# Patient Record
Sex: MALE | Race: BLACK OR AFRICAN AMERICAN | Employment: OTHER | URBAN - METROPOLITAN AREA
[De-identification: names, ages, dates, MRNs, and addresses within clinical notes are randomized per-mention and may not be internally consistent; named-entity substitution may affect disease eponyms.]

---

## 2020-11-29 ENCOUNTER — APPOINTMENT (OUTPATIENT)
Dept: GENERAL RADIOLOGY | Age: 50
End: 2020-11-29
Payer: MEDICAID

## 2020-11-29 ENCOUNTER — HOSPITAL ENCOUNTER (EMERGENCY)
Age: 50
Discharge: HOME | End: 2020-11-29
Attending: EMERGENCY MEDICINE
Payer: MEDICAID

## 2020-11-29 VITALS
OXYGEN SATURATION: 98 % | WEIGHT: 212.52 LBS | DIASTOLIC BLOOD PRESSURE: 97 MMHG | HEART RATE: 77 BPM | RESPIRATION RATE: 18 BRPM | SYSTOLIC BLOOD PRESSURE: 188 MMHG | TEMPERATURE: 99.2 F | BODY MASS INDEX: 32.21 KG/M2 | HEIGHT: 68 IN

## 2020-11-29 LAB
A/G RATIO: 1.3 (ref 1.1–2.2)
ALBUMIN SERPL-MCNC: 4 G/DL (ref 3.4–5)
ALP BLD-CCNC: 62 U/L (ref 40–129)
ALT SERPL-CCNC: 12 U/L (ref 10–40)
ANION GAP SERPL CALCULATED.3IONS-SCNC: 13 MMOL/L (ref 3–16)
AST SERPL-CCNC: 29 U/L (ref 15–37)
BASOPHILS ABSOLUTE: 0.1 K/UL (ref 0–0.2)
BASOPHILS RELATIVE PERCENT: 0.4 %
BILIRUB SERPL-MCNC: 0.4 MG/DL (ref 0–1)
BILIRUBIN URINE: NEGATIVE
BLOOD, URINE: NEGATIVE
BUN BLDV-MCNC: 8 MG/DL (ref 7–20)
CALCIUM SERPL-MCNC: 8.9 MG/DL (ref 8.3–10.6)
CHLORIDE BLD-SCNC: 102 MMOL/L (ref 99–110)
CLARITY: ABNORMAL
CO2: 23 MMOL/L (ref 21–32)
COLOR: YELLOW
CREAT SERPL-MCNC: 1 MG/DL (ref 0.9–1.3)
EOSINOPHILS ABSOLUTE: 0 K/UL (ref 0–0.6)
EOSINOPHILS RELATIVE PERCENT: 0 %
EPITHELIAL CELLS, UA: 3 /HPF (ref 0–5)
GFR AFRICAN AMERICAN: >60
GFR NON-AFRICAN AMERICAN: >60
GLOBULIN: 3 G/DL
GLUCOSE BLD-MCNC: 140 MG/DL (ref 70–99)
GLUCOSE URINE: NEGATIVE MG/DL
HCT VFR BLD CALC: 42.3 % (ref 40.5–52.5)
HEMOGLOBIN: 13.9 G/DL (ref 13.5–17.5)
HYALINE CASTS: 2 /LPF (ref 0–8)
KETONES, URINE: 40 MG/DL
LEUKOCYTE ESTERASE, URINE: NEGATIVE
LIPASE: 25 U/L (ref 13–60)
LYMPHOCYTES ABSOLUTE: 0.9 K/UL (ref 1–5.1)
LYMPHOCYTES RELATIVE PERCENT: 7.9 %
MCH RBC QN AUTO: 30.8 PG (ref 26–34)
MCHC RBC AUTO-ENTMCNC: 32.8 G/DL (ref 31–36)
MCV RBC AUTO: 93.9 FL (ref 80–100)
MICROSCOPIC EXAMINATION: YES
MONOCYTES ABSOLUTE: 0.5 K/UL (ref 0–1.3)
MONOCYTES RELATIVE PERCENT: 3.9 %
NEUTROPHILS ABSOLUTE: 10.4 K/UL (ref 1.7–7.7)
NEUTROPHILS RELATIVE PERCENT: 87.8 %
NITRITE, URINE: NEGATIVE
PDW BLD-RTO: 12.5 % (ref 12.4–15.4)
PH UA: 7 (ref 5–8)
PLATELET # BLD: 178 K/UL (ref 135–450)
PMV BLD AUTO: 8 FL (ref 5–10.5)
POTASSIUM REFLEX MAGNESIUM: 3.6 MMOL/L (ref 3.5–5.1)
PROTEIN UA: ABNORMAL MG/DL
RBC # BLD: 4.5 M/UL (ref 4.2–5.9)
RBC UA: 3 /HPF (ref 0–4)
SODIUM BLD-SCNC: 138 MMOL/L (ref 136–145)
SPECIFIC GRAVITY UA: 1.02 (ref 1–1.03)
TOTAL PROTEIN: 7 G/DL (ref 6.4–8.2)
TROPONIN: <0.01 NG/ML
URINE REFLEX TO CULTURE: ABNORMAL
URINE TYPE: ABNORMAL
UROBILINOGEN, URINE: 0.2 E.U./DL
WBC # BLD: 11.8 K/UL (ref 4–11)
WBC UA: 1 /HPF (ref 0–5)

## 2020-11-29 PROCEDURE — 36415 COLL VENOUS BLD VENIPUNCTURE: CPT

## 2020-11-29 PROCEDURE — 6370000000 HC RX 637 (ALT 250 FOR IP): Performed by: PHYSICIAN ASSISTANT

## 2020-11-29 PROCEDURE — 2500000003 HC RX 250 WO HCPCS: Performed by: PHYSICIAN ASSISTANT

## 2020-11-29 PROCEDURE — 6360000002 HC RX W HCPCS: Performed by: PHYSICIAN ASSISTANT

## 2020-11-29 PROCEDURE — 80053 COMPREHEN METABOLIC PANEL: CPT

## 2020-11-29 PROCEDURE — 99284 EMERGENCY DEPT VISIT MOD MDM: CPT

## 2020-11-29 PROCEDURE — 93005 ELECTROCARDIOGRAM TRACING: CPT | Performed by: PHYSICIAN ASSISTANT

## 2020-11-29 PROCEDURE — 71045 X-RAY EXAM CHEST 1 VIEW: CPT

## 2020-11-29 PROCEDURE — 96374 THER/PROPH/DIAG INJ IV PUSH: CPT

## 2020-11-29 PROCEDURE — 85025 COMPLETE CBC W/AUTO DIFF WBC: CPT

## 2020-11-29 PROCEDURE — 2580000003 HC RX 258: Performed by: PHYSICIAN ASSISTANT

## 2020-11-29 PROCEDURE — 83690 ASSAY OF LIPASE: CPT

## 2020-11-29 PROCEDURE — 81001 URINALYSIS AUTO W/SCOPE: CPT

## 2020-11-29 PROCEDURE — 96375 TX/PRO/DX INJ NEW DRUG ADDON: CPT

## 2020-11-29 PROCEDURE — 6370000000 HC RX 637 (ALT 250 FOR IP): Performed by: EMERGENCY MEDICINE

## 2020-11-29 PROCEDURE — 6360000002 HC RX W HCPCS: Performed by: EMERGENCY MEDICINE

## 2020-11-29 PROCEDURE — 84484 ASSAY OF TROPONIN QUANT: CPT

## 2020-11-29 RX ORDER — 0.9 % SODIUM CHLORIDE 0.9 %
1000 INTRAVENOUS SOLUTION INTRAVENOUS ONCE
Status: COMPLETED | OUTPATIENT
Start: 2020-11-29 | End: 2020-11-29

## 2020-11-29 RX ORDER — DICYCLOMINE HYDROCHLORIDE 10 MG/1
10 CAPSULE ORAL ONCE
Status: COMPLETED | OUTPATIENT
Start: 2020-11-29 | End: 2020-11-29

## 2020-11-29 RX ORDER — FAMOTIDINE 20 MG/1
20 TABLET, FILM COATED ORAL 2 TIMES DAILY
Qty: 30 TABLET | Refills: 0 | Status: SHIPPED | OUTPATIENT
Start: 2020-11-29 | End: 2022-04-01

## 2020-11-29 RX ORDER — ONDANSETRON 2 MG/ML
4 INJECTION INTRAMUSCULAR; INTRAVENOUS ONCE
Status: COMPLETED | OUTPATIENT
Start: 2020-11-29 | End: 2020-11-29

## 2020-11-29 RX ORDER — ONDANSETRON 4 MG/1
4 TABLET, ORALLY DISINTEGRATING ORAL EVERY 8 HOURS PRN
Qty: 20 TABLET | Refills: 0 | Status: SHIPPED | OUTPATIENT
Start: 2020-11-29 | End: 2022-04-01

## 2020-11-29 RX ORDER — HYDROCODONE BITARTRATE AND ACETAMINOPHEN 5; 325 MG/1; MG/1
1 TABLET ORAL ONCE
Status: COMPLETED | OUTPATIENT
Start: 2020-11-29 | End: 2020-11-29

## 2020-11-29 RX ADMIN — FAMOTIDINE 20 MG: 10 INJECTION, SOLUTION INTRAVENOUS at 17:59

## 2020-11-29 RX ADMIN — ONDANSETRON 4 MG: 2 INJECTION INTRAMUSCULAR; INTRAVENOUS at 17:59

## 2020-11-29 RX ADMIN — DICYCLOMINE HYDROCHLORIDE 10 MG: 10 CAPSULE ORAL at 17:59

## 2020-11-29 RX ADMIN — SODIUM CHLORIDE 1000 ML: 9 INJECTION, SOLUTION INTRAVENOUS at 17:59

## 2020-11-29 RX ADMIN — LIDOCAINE HYDROCHLORIDE: 20 SOLUTION ORAL; TOPICAL at 17:59

## 2020-11-29 RX ADMIN — HYDROCODONE BITARTRATE AND ACETAMINOPHEN 1 TABLET: 5; 325 TABLET ORAL at 18:39

## 2020-11-29 RX ADMIN — ONDANSETRON 4 MG: 2 INJECTION INTRAMUSCULAR; INTRAVENOUS at 18:39

## 2020-11-29 ASSESSMENT — ENCOUNTER SYMPTOMS
DIARRHEA: 1
NAUSEA: 1
CHEST TIGHTNESS: 0
VOMITING: 1
ABDOMINAL PAIN: 1
SHORTNESS OF BREATH: 0

## 2020-11-29 ASSESSMENT — PAIN DESCRIPTION - ONSET: ONSET: SUDDEN

## 2020-11-29 ASSESSMENT — PAIN SCALES - GENERAL
PAINLEVEL_OUTOF10: 10

## 2020-11-29 ASSESSMENT — PAIN - FUNCTIONAL ASSESSMENT
PAIN_FUNCTIONAL_ASSESSMENT: PREVENTS OR INTERFERES WITH MANY ACTIVE NOT PASSIVE ACTIVITIES
PAIN_FUNCTIONAL_ASSESSMENT: ACTIVITIES ARE NOT PREVENTED

## 2020-11-29 ASSESSMENT — PAIN DESCRIPTION - LOCATION
LOCATION: ABDOMEN
LOCATION: ABDOMEN

## 2020-11-29 ASSESSMENT — PAIN DESCRIPTION - PAIN TYPE
TYPE: ACUTE PAIN
TYPE: ACUTE PAIN

## 2020-11-29 ASSESSMENT — PAIN DESCRIPTION - FREQUENCY: FREQUENCY: CONTINUOUS

## 2020-11-29 ASSESSMENT — PAIN DESCRIPTION - PROGRESSION: CLINICAL_PROGRESSION: GRADUALLY WORSENING

## 2020-11-29 ASSESSMENT — PAIN DESCRIPTION - ORIENTATION: ORIENTATION: UPPER;MID

## 2020-11-29 ASSESSMENT — PAIN DESCRIPTION - DESCRIPTORS: DESCRIPTORS: SHARP

## 2020-11-29 NOTE — ED PROVIDER NOTES
EKG Interpretation    Interpreted by emergency department physician  Time read: 7544    Rhythm: Sinus  Ventricular Rate: 76  QRS Axis: 81  Ectopy: None  Conduction: Normal sinus rhythm with voltage criteria for LVH and left atrial hypertrophy  ST Segments: normal  T Waves: Inverted in lead III  Q Waves: Lead III only    Other findings: None    Compared to EKG on: None to compare    Clinical Impression: Normal sinus rhythm with voltage criteria for LVH and left atrial hypertrophy. There is inverted T waves and Q waves in lead III only. There is also S1 noted in lead I which indicates right ventricular strain pattern.     [place], [place]  11/29/20 1710

## 2020-11-29 NOTE — ED PROVIDER NOTES
I independently evaluated and obtained a history and physical on [employer]. All diagnostic, treatment, and disposition assistants were made to myself in conjunction the advanced practice provider. For further details of this patient's emergency department encounter, please see the advanced practice provider's documentation. History: 68-year-old male presents with complaints of heartburn with nausea and vomiting diarrhea after eating [employer] last night. Patient states he had some abdominal cramping last night prior to going to bed. States that he woke up feeling nauseous and had an episode of emesis. Nonbloody nonbilious. Denies any melanotic stools. Tolerating liquids by mouth. Complaining of reflux still. No recent travel. No known sick contacts. Physician Exam: Normocephalic atraumatic moist mucous membranes, regular rate and rhythm, lungs clear auscultation bilaterally, abdomen with mild epigastric tenderness, no rebound tenderness or guarding, no palpable masses, no rigidity, skin warm and dry. The Ekg interpreted by me shows  Normal sinus rhythm at a rate of 76, normal axis, , , QTc 429, no ST elevations, nonspecific ST changes, no prior EKG on file    Patient seen and evaluated. History and physical as above. Nontoxic afebrile. Patient with nausea and vomiting with some diarrhea after eating [employer]. Lab work unremarkable except for mildly elevated white count 11.8 which is likely reactive. Urinalysis negative for infection. Patient feeling better after antiemetics, Pepcid, Bentyl and GI cocktail. Plan for discharge with short course of Zofran and Pepcid with outpatient follow-up. All questions answered prior to discharge. I estimate there is LOW risk for ACUTE APPENDICITIS, BOWEL OBSTRUCTION, CHOLECYSTITIS, DIVERTICULITIS, INCARCERATED HERNIA, PANCREATITIS, or PERFORATED BOWEL or ULCER, thus I consider the discharge disposition reasonable. Also, there is no evidence or peritonitis, sepsis, or toxicity. Lorita Boast and I have discussed the diagnosis and risks, and we agree with discharging home to follow-up with their primary doctor. We also discussed returning to the Emergency Department immediately if new or worsening symptoms occur. We have discussed the symptoms which are most concerning (e.g., bloody stool, fever, changing or worsening pain, vomiting) that necessitate immediate return. FINAL Impression    1. Non-intractable vomiting with nausea, unspecified vomiting type    2. Abdominal pain, epigastric        Blood pressure (!) 188/97, pulse 77, temperature 99.2 °F (37.3 °C), temperature source Oral, resp. rate 18, height 5' 7.5\" (1.715 m), weight 212 lb 8.4 oz (96.4 kg), SpO2 98 %.       DISPOSITION Decision To Discharge 11/29/2020 06:28:08 PM    Mercedes Parker MD  [facility] [employer]  11/29/20  6:28 PM EST         Mercedes Parker MD  11/29/20 8116

## 2020-11-29 NOTE — ED PROVIDER NOTES
[facility] EMERGENCY DEPARTMENT  [address] [place] [address] [address]  Dept: [phone]  Loc: [phone]  eMERGENCYdEPARTMENT eNCOUnter      Pt Name: Claudetta Rides  MRN: [id number]  Audiegftaylor 1970  Date of evaluation: 11/29/2020  Provider:Jada Luevano PA-C    CHIEF COMPLAINT       Chief Complaint   Patient presents with    Gastroesophageal Reflux     pt stated ate [employer] last night and developed heart burn, +vomiting/diarrhea; no chest pain; upper abd pain; no fever;     Abdominal Pain       HISTORY OF PRESENT ILLNESS  (Location/Symptom, Timing/Onset, Context/Setting, Quality, Duration,Modifying Factors, Severity.)   Claudetta Rides is a 44 y.o. male who presents to the emergency department by private vehicle complaining of nausea, vomiting, diarrhea and upper abdominal pain. Patient states he had white [place] last night around midnight. Around 1 AM he began with generalized upper abdominal cramping, nausea, vomiting and diarrhea. Patient states had multiple episodes of nonbloody diarrhea and emesis since onset. He states he has pain rated 10/10 throughout his upper abdomen described as cramping in sensation. He is concerned for acid reflux or food poisoning. This prompted valuation in the ED. He denies any chest pain, shortness of breath, diaphoresis, fever, chills, cough. No COVID-19 exposures. No previous abdominal surgeries. Nursing Notes were reviewedand agreed with or any disagreements were addressed in the HPI. REVIEW OF SYSTEMS    (2-9 systems for level 4, 10 or more for level 5)     Review of Systems   Constitutional: Negative for chills, fatigue and fever. HENT: Negative. Respiratory: Negative for chest tightness and shortness of breath. Cardiovascular: Negative. Gastrointestinal: Positive for abdominal pain, diarrhea, nausea and vomiting. Genitourinary: Negative.     Musculoskeletal: Negative for arthralgias and myalgias. Neurological: Negative for dizziness and light-headedness. Psychiatric/Behavioral: Negative for behavioral problems and confusion. Except as noted above the remainder of the review of systems was reviewed and negative. PAST MEDICAL HISTORY   No past medical history on file. SURGICAL HISTORY     No past surgical history on file. CURRENT MEDICATIONS     [unfilled]    ALLERGIES     Patient has no known allergies. FAMILY HISTORY     No family history on file. No family status information on file. SOCIAL HISTORY          PHYSICAL EXAM    (up to 7 for level 4, 8 or more for level 5)     ED Triage Vitals [11/29/20 1722]   Enc Vitals Group      BP (!) 188/97      Pulse 77      Resp 18      Temp 99.2 °F (37.3 °C)      Temp Source Oral      SpO2 98 %      Weight 212 lb 8.4 oz (96.4 kg)      Height 5' 7.5\" (1.715 m)      Head Circumference       Peak Flow       Pain Score       Pain Loc       Pain Edu? Excl. in [address]? Physical Exam  Vitals signs reviewed. Constitutional:       Appearance: He is well-developed. HENT:      Head: Normocephalic and atraumatic. Neck:      Musculoskeletal: Normal range of motion and neck supple. Pulmonary:      Effort: Pulmonary effort is normal. No respiratory distress. Abdominal:      Palpations: Abdomen is soft. Tenderness: There is abdominal tenderness (epigastric). There is no guarding or rebound. Musculoskeletal: Normal range of motion. Skin:     General: Skin is warm. Neurological:      General: No focal deficit present. Mental Status: He is alert and oriented to person, place, and time.    Psychiatric:         Mood and Affect: Mood normal.         Behavior: Behavior normal.           DIAGNOSTIC RESULTS     EKG: All EKG's are interpreted by the Emergency Department Physician who either signs or Co-signs this chart in the absence of a cardiologist.    RADIOLOGY:   Non-plain film images such as CT, Ultrasound and MRI are read by the radiologist. Plain radiographic images are visualized and preliminarilyinterpreted by the emergency physician with the below findings:    Interpretation per the Radiologist below,if available at the time of this note:    XR CHEST PORTABLE   Final Result   Radiographically clear lungs.                LABS:  Labs Reviewed   CBC WITH AUTO DIFFERENTIAL - Abnormal; Notable for the following components:       Result Value    WBC 11.8 (*)     Neutrophils Absolute 10.4 (*)     Lymphocytes Absolute 0.9 (*)     All other components within normal limits    Narrative:     Performed at:  [address] [place] [address]   Phone [phone]   COMPREHENSIVE METABOLIC PANEL W/ REFLEX TO MG FOR LOW K - Abnormal; Notable for the following components:    Glucose 140 (*)     All other components within normal limits    Narrative:     Performed at:  [address] [place] [address]   Phone [phone]   URINE RT REFLEX TO CULTURE - Abnormal; Notable for the following components:    Clarity, UA CLOUDY (*)     Ketones, Urine 40 (*)     Protein, UA TRACE (*)     All other components within normal limits    Narrative:     Performed at:  [address] [place] [address]   Phone [phone]   LIPASE    Narrative:     Performed at:  [facility]  [address] [place] [address] [employer] [address]   Phone [phone]   TROPONIN    Narrative:     Performed at:  [facility]  [address] [place] [address] [employer] [address]   Phone [phone]   MICROSCOPIC URINALYSIS    Narrative:     Performed at:  [facility]  [address] [place] [address] [employer] [address]   Phone [phone]       All other labs were within normal range or not returned as of this

## 2020-11-30 LAB
EKG ATRIAL RATE: 76 BPM
EKG DIAGNOSIS: NORMAL
EKG P AXIS: 35 DEGREES
EKG P-R INTERVAL: 150 MS
EKG Q-T INTERVAL: 382 MS
EKG QRS DURATION: 100 MS
EKG QTC CALCULATION (BAZETT): 429 MS
EKG R AXIS: 81 DEGREES
EKG T AXIS: 20 DEGREES
EKG VENTRICULAR RATE: 76 BPM

## 2020-11-30 PROCEDURE — 93010 ELECTROCARDIOGRAM REPORT: CPT | Performed by: INTERNAL MEDICINE

## 2020-11-30 NOTE — ED NOTES
Pt c/o no pain relief from multiple medications, dr Thanh Hartley made aware and spoke with pt bedside regarding his diagnosis, pt is ok to leave     Anusha Ellis RN  11/29/20 1956

## 2021-08-26 ENCOUNTER — APPOINTMENT (OUTPATIENT)
Dept: GENERAL RADIOLOGY | Age: 51
End: 2021-08-26
Payer: COMMERCIAL

## 2021-08-26 ENCOUNTER — HOSPITAL ENCOUNTER (EMERGENCY)
Age: 51
Discharge: HOME | End: 2021-08-26
Payer: COMMERCIAL

## 2021-08-26 VITALS
HEART RATE: 80 BPM | WEIGHT: 224.21 LBS | DIASTOLIC BLOOD PRESSURE: 96 MMHG | TEMPERATURE: 97.5 F | OXYGEN SATURATION: 97 % | SYSTOLIC BLOOD PRESSURE: 138 MMHG | HEIGHT: 68 IN | BODY MASS INDEX: 33.98 KG/M2 | RESPIRATION RATE: 16 BRPM

## 2021-08-26 DIAGNOSIS — M25.471 RIGHT ANKLE SWELLING: ICD-10-CM

## 2021-08-26 DIAGNOSIS — S91.309A OPEN WOUND OF FOOT EXCLUDING TOES: Primary | ICD-10-CM

## 2021-08-26 PROCEDURE — 73610 X-RAY EXAM OF ANKLE: CPT

## 2021-08-26 PROCEDURE — 99283 EMERGENCY DEPT VISIT LOW MDM: CPT

## 2021-08-26 PROCEDURE — 93971 EXTREMITY STUDY: CPT

## 2021-08-26 RX ORDER — SULFAMETHOXAZOLE AND TRIMETHOPRIM 800; 160 MG/1; MG/1
1 TABLET ORAL 2 TIMES DAILY
Qty: 20 TABLET | Refills: 0 | Status: SHIPPED | OUTPATIENT
Start: 2021-08-26 | End: 2021-09-05

## 2021-08-26 RX ORDER — CEPHALEXIN 500 MG/1
500 CAPSULE ORAL 4 TIMES DAILY
Qty: 40 CAPSULE | Refills: 0 | Status: SHIPPED | OUTPATIENT
Start: 2021-08-26 | End: 2021-09-05

## 2021-08-26 ASSESSMENT — PAIN DESCRIPTION - LOCATION: LOCATION: ANKLE

## 2021-08-26 ASSESSMENT — PAIN DESCRIPTION - DESCRIPTORS: DESCRIPTORS: SHARP

## 2021-08-26 ASSESSMENT — PAIN SCALES - GENERAL: PAINLEVEL_OUTOF10: 2

## 2021-08-26 ASSESSMENT — PAIN DESCRIPTION - ORIENTATION: ORIENTATION: RIGHT
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
## 2021-08-26 NOTE — ED NOTES
D/C: Order noted for d/c. Pt confirmed d/c paperwork and three prescriptions have correct name. Discharge and education instructions reviewed with patient. Teach-back successful. Pt verbalized understanding and signed d/c papers. Pt denied questions at this time. No acute distress noted. Patient instructed to follow-up as noted - return to emergency department if symptoms worsen. Patient verbalized understanding. Discharged per EDMD with discharge instructions. Pt discharged to private vehicle. Patient stable upon departure. Thanked patient for choosing [facility] for care. Provider aware of patient pain at time of discharge.        Luis Rodriguez RN  08/26/21 3753

## 2022-04-05 ENCOUNTER — ANESTHESIA EVENT (OUTPATIENT)
Dept: ENDOSCOPY | Age: 52
End: 2022-04-05

## 2022-04-07 ENCOUNTER — ANESTHESIA (OUTPATIENT)
Dept: ENDOSCOPY | Age: 52
End: 2022-04-07

## 2022-04-18 NOTE — PROGRESS NOTES
[facility] ENDOSCOPY COLONOSCOPY PRE-OPERATIVE INSTRUCTIONS    Procedure date___4/21/22______Arrival time______1330_____        Surgery time____1430________       Clear liquids the day before the procedure. Do not eat or drink anything within 5 hours of your procedure. This includes water chewing gum, mints and ice chips. You may brush your teeth and gargle the morning of your surgery, but do not swallow the water    You may be asked to stop blood thinners such as Coumadin, Plavix, Fragmin, Lovenox, etc., or any anti-inflammatories such as:  Aspirin, Ibuprofen, Advil, Naproxen prior to your procedure. We also ask that you stop any OTC medications such as fish oil, vitamin E, glucosamine, garlic, Multivitamins, COQ 10, etc.    You must make arrangements for a responsible adult to arrive with you and stay in our waiting area during your procedure. They will also need to take you home after your procedure. For your safety you will not be allowed to leave alone or drive yourself home. Also for your safety, it is strongly suggested that someone stay with you the first 24 hours after your procedure. For your comfort, please wear simple loose fitting clothing to the center. Please do not bring valuables. If you have a living will and a durable power of  for healthcare, please bring in a copy.      You will need to bring a photo ID and insurance card    Our goal is to provide you with excellent care so if you have any questions, please contact us at the [facility] at [phone]         Please note these are generalized instructions for all colonoscopy cases, you may be provided with more specific instructions if necessary

## 2022-04-20 ENCOUNTER — ANESTHESIA EVENT (OUTPATIENT)
Dept: ENDOSCOPY | Age: 52
End: 2022-04-20
Payer: COMMERCIAL

## 2022-04-21 ENCOUNTER — HOSPITAL ENCOUNTER (OUTPATIENT)
Age: 52
Setting detail: OUTPATIENT SURGERY
Discharge: HOME | End: 2022-04-21
Attending: INTERNAL MEDICINE | Admitting: INTERNAL MEDICINE
Payer: COMMERCIAL

## 2022-04-21 ENCOUNTER — ANESTHESIA (OUTPATIENT)
Dept: ENDOSCOPY | Age: 52
End: 2022-04-21
Payer: COMMERCIAL

## 2022-04-21 VITALS
RESPIRATION RATE: 16 BRPM | HEIGHT: 68 IN | HEART RATE: 71 BPM | SYSTOLIC BLOOD PRESSURE: 122 MMHG | OXYGEN SATURATION: 98 % | TEMPERATURE: 96.4 F | BODY MASS INDEX: 32.94 KG/M2 | WEIGHT: 217.38 LBS | DIASTOLIC BLOOD PRESSURE: 79 MMHG

## 2022-04-21 VITALS
DIASTOLIC BLOOD PRESSURE: 83 MMHG | OXYGEN SATURATION: 100 % | SYSTOLIC BLOOD PRESSURE: 130 MMHG | RESPIRATION RATE: 14 BRPM

## 2022-04-21 DIAGNOSIS — Z86.010 HISTORY OF COLON POLYPS: ICD-10-CM

## 2022-04-21 PROCEDURE — 2580000003 HC RX 258: Performed by: ANESTHESIOLOGY

## 2022-04-21 PROCEDURE — 3700000001 HC ADD 15 MINUTES (ANESTHESIA): Performed by: INTERNAL MEDICINE

## 2022-04-21 PROCEDURE — 2500000003 HC RX 250 WO HCPCS: Performed by: NURSE ANESTHETIST, CERTIFIED REGISTERED

## 2022-04-21 PROCEDURE — 3700000000 HC ANESTHESIA ATTENDED CARE: Performed by: INTERNAL MEDICINE

## 2022-04-21 PROCEDURE — 2720000010 HC SURG SUPPLY STERILE: Performed by: INTERNAL MEDICINE

## 2022-04-21 PROCEDURE — 3609010600 HC COLONOSCOPY POLYPECTOMY SNARE/COLD BIOPSY: Performed by: INTERNAL MEDICINE

## 2022-04-21 PROCEDURE — 6360000002 HC RX W HCPCS: Performed by: NURSE ANESTHETIST, CERTIFIED REGISTERED

## 2022-04-21 PROCEDURE — 2709999900 HC NON-CHARGEABLE SUPPLY: Performed by: INTERNAL MEDICINE

## 2022-04-21 PROCEDURE — 7100000011 HC PHASE II RECOVERY - ADDTL 15 MIN: Performed by: INTERNAL MEDICINE

## 2022-04-21 PROCEDURE — 88305 TISSUE EXAM BY PATHOLOGIST: CPT

## 2022-04-21 PROCEDURE — 7100000010 HC PHASE II RECOVERY - FIRST 15 MIN: Performed by: INTERNAL MEDICINE

## 2022-04-21 RX ORDER — PROPOFOL 10 MG/ML
INJECTION, EMULSION INTRAVENOUS PRN
Status: DISCONTINUED | OUTPATIENT
Start: 2022-04-21 | End: 2022-04-21 | Stop reason: SDUPTHER

## 2022-04-21 RX ORDER — SODIUM CHLORIDE 0.9 % (FLUSH) 0.9 %
5-40 SYRINGE (ML) INJECTION EVERY 12 HOURS SCHEDULED
Status: DISCONTINUED | OUTPATIENT
Start: 2022-04-21 | End: 2022-04-21 | Stop reason: HOSPADM

## 2022-04-21 RX ORDER — SODIUM CHLORIDE 9 MG/ML
INJECTION, SOLUTION INTRAVENOUS PRN
Status: DISCONTINUED | OUTPATIENT
Start: 2022-04-21 | End: 2022-04-21 | Stop reason: HOSPADM

## 2022-04-21 RX ORDER — LIDOCAINE HYDROCHLORIDE 20 MG/ML
INJECTION, SOLUTION EPIDURAL; INFILTRATION; INTRACAUDAL; PERINEURAL PRN
Status: DISCONTINUED | OUTPATIENT
Start: 2022-04-21 | End: 2022-04-21 | Stop reason: SDUPTHER

## 2022-04-21 RX ORDER — SODIUM CHLORIDE 0.9 % (FLUSH) 0.9 %
5-40 SYRINGE (ML) INJECTION PRN
Status: DISCONTINUED | OUTPATIENT
Start: 2022-04-21 | End: 2022-04-21 | Stop reason: HOSPADM

## 2022-04-21 RX ADMIN — SODIUM CHLORIDE: 9 INJECTION, SOLUTION INTRAVENOUS at 13:10

## 2022-04-21 RX ADMIN — LIDOCAINE HYDROCHLORIDE 80 MG: 20 INJECTION, SOLUTION EPIDURAL; INFILTRATION; INTRACAUDAL; PERINEURAL at 13:16

## 2022-04-21 RX ADMIN — PROPOFOL 50 MG: 10 INJECTION, EMULSION INTRAVENOUS at 13:27

## 2022-04-21 RX ADMIN — PROPOFOL 50 MG: 10 INJECTION, EMULSION INTRAVENOUS at 13:21

## 2022-04-21 RX ADMIN — PROPOFOL 100 MG: 10 INJECTION, EMULSION INTRAVENOUS at 13:16

## 2022-04-21 RX ADMIN — PROPOFOL 50 MG: 10 INJECTION, EMULSION INTRAVENOUS at 13:35

## 2022-04-21 RX ADMIN — SODIUM CHLORIDE: 9 INJECTION, SOLUTION INTRAVENOUS at 12:51

## 2022-04-21 ASSESSMENT — PAIN SCALES - GENERAL
PAINLEVEL_OUTOF10: 0
PAINLEVEL_OUTOF10: 0

## 2022-04-21 ASSESSMENT — PAIN - FUNCTIONAL ASSESSMENT: PAIN_FUNCTIONAL_ASSESSMENT: NONE - DENIES PAIN

## 2022-04-21 NOTE — H&P
[facility] [address] [facility]   Pre-operative History and Physical    Patient: Lorita Boast  : 1970  Acct#:     HISTORY OF PRESENT ILLNESS:    The patient is a 46 y.o. male who presents for colon cancer screening history of colon polyps    Indications: History of colon polyps    Past Medical History:        Diagnosis Date    Asthma     Colon polyp     COPD (chronic obstructive pulmonary disease) ([facility] [address]     History of exposure to asbestos 2018    Reflux esophagitis       Past Surgical History:        Procedure Laterality Date    COLONOSCOPY      FRACTURE SURGERY      LEG SURGERY        Medications Prior to Admission:   No current facility-administered medications on file prior to encounter. Current Outpatient Medications on File Prior to Encounter   Medication Sig Dispense Refill    clobetasol (TEMOVATE) 0.05 % ointment Apply topically 2 times daily      gabapentin (NEURONTIN) 400 MG capsule Take 600 mg by mouth 3 times daily.  meloxicam (MOBIC) 7.5 MG tablet Take 7.5 mg by mouth daily as needed      oxyCODONE-acetaminophen (PERCOCET) 5-325 MG per tablet Take 1 tablet by mouth every 4 hours as needed for Pain. Allergies:  Patient has no known allergies.     Social History:   Social History     Socioeconomic History    Marital status: Single     Spouse name: Not on file    Number of children: Not on file    Years of education: Not on file    Highest education level: Not on file   Occupational History    Not on file   Tobacco Use    Smoking status: Never Smoker    Smokeless tobacco: Never Used   Vaping Use    Vaping Use: Never used   Substance and Sexual Activity    Alcohol use: Yes     Comment: occ    Drug use: Never    Sexual activity: Not Currently   Other Topics Concern    Not on file   Social History Narrative    Not on file     Social Determinants of Health     Financial Resource Strain:     Difficulty of Paying Living Expenses: Not on file   Food Insecurity:     Worried About [facility] in the Last Year: Not on file    Pop of Food in the Last Year: Not on file   Transportation Needs:     Lack of Transportation (Medical): Not on file    Lack of Transportation (Non-Medical): Not on file   Physical Activity:     Days of Exercise per Week: Not on file    Minutes of Exercise per Session: Not on file   Stress:     Feeling of Stress : Not on file   Social Connections:     Frequency of Communication with Friends and Family: Not on file    Frequency of Social Gatherings with Friends and Family: Not on file    Attends Gnosticism Services: Not on file    Active Member of [address] [place] [address] or Organizations: Not on file    Attends Club or Organization Meetings: Not on file    Marital Status: Not on file   Intimate Partner Violence:     Fear of Current or Ex-Partner: Not on file    Emotionally Abused: Not on file    Physically Abused: Not on file    Sexually Abused: Not on file   Housing Stability:     Unable to Pay for Housing in the Last Year: Not on file    Number of [place] in the Last Year: Not on file    Unstable Housing in the Last Year: Not on file      Family History:       Problem Relation Age of Onset    No Known Problems Mother     No Known Problems Father         PHYSICAL EXAM:      /85   Pulse 64   Temp 96 °F (35.6 °C) (Temporal)   Resp 18   Ht 5' 8\" (1.727 m)   Wt 217 lb 6 oz (98.6 kg)   SpO2 100%   BMI 33.05 kg/m²  I        Heart:  Normal apical impulse, regular rate and rhythm, normal S1 and S2, no S3 or S4, and no murmur noted    Lungs:  No increased work of breathing, good air exchange, clear to auscultation bilaterally, no crackles or wheezing    Abdomen:  No scars, normal bowel sounds, soft, non-distended, non-tender, no masses palpated, no hepatosplenomegally      ASA Class  ASA 3 - Patient with moderate systemic disease with functional limitations    Mallampati Class: 3      ASSESSMENT AND PLAN:    1.   Patient is a suitable candidate for endoscopic procedure and attendant anesthesia  2. Risks, benefits, alternatives of procedure discussed in detail with patient including risks of bleeding, infection, perforation, risks of sedation, risks of missed lesions. The patient wishes to proceed.

## 2022-04-21 NOTE — PROCEDURES
Colonoscopy Procedure  Note          Patient: Eleni Marroquin  : 1970      Procedure: Colonoscopy with hot snare and cold snare polypectomy    Date:  2022    Primary Care Physician: SANTANA Law - CNP     Operative surgeon: Adrienne Gregory MD  Previous Colonoscopy:  Dr. Shahram Gardner   Consent: I explained and discussed the risk, benefits and alternatives for the procedure with the patient and obtained the patient's consent for the procedure. We discussed the specific risks including bleeding, perforation, post-procedure abdominal pain, and missed lesions which could lead to interval colorectal cancers. History:       Past Medical History:   Diagnosis Date    Asthma     Colon polyp     COPD (chronic obstructive pulmonary disease) ([facility] [address]     History of exposure to asbestos 2018    Reflux esophagitis       Preoperative Diagnosis: History of colon polyps  Post Operative Diagnosis: Colon polyps  ASA: 3  SEDATION: MAC      Procedures Performed: Colonoscopy   Scope Type: Adult    Procedure Details:      With the patient in left lateral decubitus position the endoscope was inserted through the anorectal area into the rectum. The scope was then advanced through the length of the colon to the cecum. The quality of preparation was good. The scope was carefully withdrawn with careful inspection. Images were taken of the multiple segments of colon, cecum, IC valve, rectum. Retroflexion was preformed in the rectum. Cecum Intubated: Yes  EBL: minimal to none  Complications:  no complications were noted  Post-operative Findings: Perianal exam unremarkable, digital rectal exam unremarkable, retroflexion the rectum demonstrated small internal hemorrhoids    In the transverse colon there was a 5 mm sessile polyp removed with cold snare resection retrieval complete    There is a 1.2 cm semi-pedunculated polyp removed with hot snare resection retrieval complete.   Not adequate cautery no bleeding however 1 clip was placed over polypectomy site to prevent. Otherwise the colonic mucosa was normal    Plan: Repeat colonoscopy in 3 years. Signed By: MD Fadi Cullen MD,   Adriel Zavala  4/21/2022      Please note that some or all of this record was generated using voice recognition software. If there are any questions about the content of this document, please contact the author as some errors in translation may have occurred.
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
## 2022-04-21 NOTE — PROGRESS NOTES
Resolution® Clip Device    Date Placed: _________________________  Anatomical Location: ___________________      MR Conditional     Magnetic Resonance (MR) Information  Non-clinical testing has demonstrated the Resolution® Clip is MR Conditional according to  ASTM . A patient with this clip(s) can be safely scanned under the following conditions:    Static Magnetic Field  Static magnetic field of 1.5 and 3 Judith with:   Spatial gradient field of 2500 Gauss/cm (value extrapolated) and less   Maximum whole body averaged specific absorption rate (DULCE) of 2 W/kg in Normal Operating  Mode for a maximum scan time of 15 minutes of continuous scanning at 1.5T and at 3T. MR Related Heating  In non-clinical testing, the Resolution Clips produced a temperature rise of less than 1.4 °C at a maximum  extrapolated WBA DULCE of 2.0 W/kg for 15 min. of continuous MR scanning with body coil in a 1.5 Judith [facility] [address]Alexis (software: release [de-identified], 2010-12-02) MR Scanner. In non-clinical testing, the Resolution Clips produced a temperature rise of less than 4.0 °C at a maximum  extrapolated WBA DULCE of 2.0 W/kg for 15 min. of continuous MR scanning with body coil in a 3 Judith [employer]  Trio, [employer] (software: [employer], [employer] MRA30) MR Scanner. Image Artifacts  MR image quality may be compromised if the area of interest is within approximately 80 mm of the clip(s) as  found in non-clinical testing using a spin echo and gradient echo pulse sequence in a 3T MR system [place], [facility] [place], [place], software [de-identified] 2010-11-24). Therefore, it may be necessary  to optimize MR Imaging parameters in the presence of this implant. [employer] recommends that the patient register the MR conditions disclosed in this DFU with the  [employer] (www.medicalert. org) or equivalent organization. Resolution® Clip Device    Date Placed: ____4/21/22_____________________  Anatomical Location: ________transverse colon___________      MR Conditional     Magnetic Resonance (MR) Information  Non-clinical testing has demonstrated the Resolution® Clip is MR Conditional according to  ASTM . A patient with this clip(s) can be safely scanned under the following conditions:    Static Magnetic Field  Static magnetic field of 1.5 and 3 Judith with:   Spatial gradient field of 2500 Gauss/cm (value extrapolated) and less   Maximum whole body averaged specific absorption rate (DULCE) of 2 W/kg in Normal Operating  Mode for a maximum scan time of 15 minutes of continuous scanning at 1.5T and at 3T. MR Related Heating  In non-clinical testing, the Resolution Clips produced a temperature rise of less than 1.4 °C at a maximum  extrapolated WBA DULCE of 2.0 W/kg for 15 min. of continuous MR scanning with body coil in a 1.5 Judith [facility]Alexis (software: release [de-identified], 2010-12-02) MR Scanner. In non-clinical testing, the Resolution Clips produced a temperature rise of less than 4.0 °C at a maximum  extrapolated WBA DULCE of 2.0 W/kg for 15 min. of continuous MR scanning with body coil in a 3 Judith [employer]  Trio, [employer] (software: [employer], [employer] MRA30) MR Scanner. Image Artifacts  MR image quality may be compromised if the area of interest is within approximately 80 mm of the clip(s) as  found in non-clinical testing using a spin echo and gradient echo pulse sequence in a 3T MR system [place], The [facility] [place], [place], software [de-identified] 2010-11-24). Therefore, it may be necessary  to optimize MR Imaging parameters in the presence of this implant. [employer] recommends that the patient register the MR conditions disclosed in this DFU with the  [employer] ([email or web address].[email or web address] org) or equivalent organization.

## 2022-04-22 NOTE — ANESTHESIA POSTPROCEDURE EVALUATION
Department of Anesthesiology  Postprocedure Note    Patient: Claudetta Rides  MRN: [id number]  YOB: 1970  Date of evaluation: 4/22/2022  Time:  2:12 PM     Procedure Summary     Date: 04/21/22 Room / Location: [address]    Anesthesia Start: 1310 Anesthesia Stop: 5860    Procedure: COLONOSCOPY POLYPECTOMY SNARE/COLD BIOPSY (N/A ) Diagnosis:       History of colon polyps      (History of colon polyps)    Surgeons: Julito Fuller MD Responsible Provider: Vanessa Reyna MD    Anesthesia Type: MAC ASA Status: 2          Anesthesia Type: MAC    Steven Phase I: Steven Score: 10    Steven Phase II: Steven Score: 10    Last vitals: Reviewed and per EMR flowsheets. Anesthesia Post Evaluation    Patient location during evaluation: PACU  Patient participation: complete - patient participated  Level of consciousness: awake and alert  Airway patency: patent  Nausea & Vomiting: no nausea and no vomiting  Complications: no  Cardiovascular status: hemodynamically stable and blood pressure returned to baseline  Respiratory status: spontaneous ventilation, nonlabored ventilation and room air  Hydration status: stable  Comments: Mr. Lurlean Galeazzi resting comfortably following procedure. Appropriate for discharge with .       Vanessa Reyna MD

## (undated) DEVICE — CLIP INT L235CM WRK CHN DIA2.8MM OPN 11MM BRAID CATH ROT BX/10

## (undated) DEVICE — SNARE ENDOSCP POLYP 2.4 MM 240 CM 10 MM 2.8 MM CAPTIVATOR